# Patient Record
Sex: FEMALE | Race: WHITE | NOT HISPANIC OR LATINO | Employment: OTHER
[De-identification: names, ages, dates, MRNs, and addresses within clinical notes are randomized per-mention and may not be internally consistent; named-entity substitution may affect disease eponyms.]

---

## 2017-06-17 ENCOUNTER — HEALTH MAINTENANCE LETTER (OUTPATIENT)
Age: 52
End: 2017-06-17

## 2019-10-02 ENCOUNTER — HEALTH MAINTENANCE LETTER (OUTPATIENT)
Age: 54
End: 2019-10-02

## 2021-01-15 ENCOUNTER — HEALTH MAINTENANCE LETTER (OUTPATIENT)
Age: 56
End: 2021-01-15

## 2021-09-04 ENCOUNTER — HEALTH MAINTENANCE LETTER (OUTPATIENT)
Age: 56
End: 2021-09-04

## 2021-10-30 ENCOUNTER — HEALTH MAINTENANCE LETTER (OUTPATIENT)
Age: 56
End: 2021-10-30

## 2022-02-19 ENCOUNTER — HEALTH MAINTENANCE LETTER (OUTPATIENT)
Age: 57
End: 2022-02-19

## 2022-10-22 ENCOUNTER — HEALTH MAINTENANCE LETTER (OUTPATIENT)
Age: 57
End: 2022-10-22

## 2023-04-01 ENCOUNTER — HEALTH MAINTENANCE LETTER (OUTPATIENT)
Age: 58
End: 2023-04-01

## 2023-11-05 ENCOUNTER — HEALTH MAINTENANCE LETTER (OUTPATIENT)
Age: 58
End: 2023-11-05

## 2024-01-08 ENCOUNTER — LAB REQUISITION (OUTPATIENT)
Dept: LAB | Facility: CLINIC | Age: 59
End: 2024-01-08
Payer: COMMERCIAL

## 2024-01-08 PROCEDURE — 88342 IMHCHEM/IMCYTCHM 1ST ANTB: CPT | Mod: 26 | Performed by: PATHOLOGY

## 2024-01-08 PROCEDURE — 88341 IMHCHEM/IMCYTCHM EA ADD ANTB: CPT | Mod: 26 | Performed by: PATHOLOGY

## 2024-01-08 PROCEDURE — 88342 IMHCHEM/IMCYTCHM 1ST ANTB: CPT | Mod: TC,ORL | Performed by: OBSTETRICS & GYNECOLOGY

## 2024-01-08 PROCEDURE — 88305 TISSUE EXAM BY PATHOLOGIST: CPT | Mod: 26 | Performed by: PATHOLOGY

## 2024-01-18 LAB
PATH REPORT.COMMENTS IMP SPEC: ABNORMAL
PATH REPORT.COMMENTS IMP SPEC: YES
PATH REPORT.FINAL DX SPEC: ABNORMAL
PATH REPORT.GROSS SPEC: ABNORMAL
PATH REPORT.MICROSCOPIC SPEC OTHER STN: ABNORMAL
PATH REPORT.MICROSCOPIC SPEC OTHER STN: ABNORMAL
PATH REPORT.RELEVANT HX SPEC: ABNORMAL
PATHOLOGY SYNOPTIC REPORT: ABNORMAL
PHOTO IMAGE: ABNORMAL

## 2024-03-08 NOTE — PROGRESS NOTES
GYNECOLOGIC ONCOLOGY CONSULT    Patient Name: BARBIE OCNTI Patient : 1965  Patient MRN: [id number]  Referring Physician: Kenia Griffith MD (Obstetrics/Gynecology)  Primary GYNOncologist: Sweetie Young (Gynecological/Oncology)  Date of Service: 2024    Reason for Consult:  FIGO grade 1 endometrioid endometrial carcinoma    History of Present Illness (Gyn Oncology):  Barbie Conti is a edu 58 year old  post-menopausal female with a new diagnosis of FIGO grade 1 endometrioid endometrial carcinoma. After workup for postmenopausal bleeding, she had a pelvic ultrasound on 2023, which demonstrated a 9.4 x 6 x 4.5 cm uterus with an 18.4 mm stripe. Bilateral ovaries are not visualized. She was taken to the operating room and underwent hysteroscopy and D&C on 2024. This revealed FIGO grade 1 endometrioid endometrial carcinoma, MMR intact. She was referred to GYN oncology and is here today for initial consultation. She is unaccompanied. She denies any previous issues or family history of issues with anesthesia.    Genetic Testing  pMMR on endometrial biopsy    Review of Systems:  A complete 14-point review of systems is negative except as noted in the above history of present illness.    Past Medical History:  1. Anxiety  2. Hypothyroidism  3. History of kidney stones  4. Hypertension  5. Hyperlipidemia    Surgical History:  1. Tonsillectomy  2. Laparoscopic cholecystectomy -   3. Hysteroscopy, dilation and curettage on 2024    OB/Gyn History:  .  x 4.   Has history of uterine fibroids  Reports a history of abnormal Pap smears, last Pap smear on 2023 was NILM with negative HPV code testing    Allergies#  Fish Containing Products and tree and shrub pollen    Medications:  Propranolol Oral 10 mg tablet po PRN (for situational anxiety)  Rizatriptan Oral 10 mg tablet po PRN (onset of headache)  Ketorolac Oral 10 mg tablet po every 6 hrs PRN  Levothyroxine Oral 125  mcg tablet po QD before breakfast  Lorazepam Oral 0.5 mg tablet po  Trazodone Oral 50 mg tablet 1/2 tablet po HS  Levalbuterol Inhaler 45 mcg/actuation HFA aerosol inhaler every 6 hrs PRN 1-2 puffs  Venlafaxine Oral 24 hr Cap 150 mg capsule,extended release 24hr po daily    Family History:  Endometrial Ca - PGM    Social History:  Smoking Status Smoking Tobacco : Never smoker; Smokeless Tobacco : Never used smokeless tobacco; Vaping : Never vaped  Reports minimal alcohol use.   x 33 years.  lives a half block away with his mother. Has 2 adult sons who live at home with her.  Lives in house in [place].   Homemaker.    Health Maintenance:  Last pap smear: 2023  Last mammogram:   Last colonoscopy:     Vital Signs:  Blood pressure: 132/86, Pulse: 69, Temperature: 98.3 F, Respirations: 16, O2 sat: 96%, Pain Scale: 0, Height: 67 in, Weight: 279.6 lb, BSA: 2.33, BMI: 43.79 kg/m2    Physical Exam (Gyn Oncology):  General: alert, cooperative, conversational, no acute distress  HEENT: atraumatic, normocephalic, no scleral icterus, trachea midline, no obvious thyromegaly  Cardiac: RRR, no murmurs heard  Lungs: CTAB, good inspiratory effort bilaterally  Abdomen: soft, non-distended, non-tender.   Extremities: no edema, non-tender  Neurologic Exam: no focal deficits  Psych: normal mood and affect  Pelvic: deferred to OR      Laboratory Data:    2024 - Pathology Reports    Final Diagnosis  A. Endometrium, curettings:  -ENDOMETRIAL ENDOMETRIOID CARCINOMA, WITH MUCINOUS FEATURES, FIGO grade 1.  -Ancillary testing (see biomarker report for details):  -MMR IHC: INTACT.            [facility] - Ultrasound reports (scanned) - US Pelvis    Uterus & Endometrium  Appearance  The uterus is sonographically normal in size and echotexture.  Size (L, W, H) 9.4 cm  6.0 cm  4.5 cm  Fibroid None  visualized  Uterine Position  anteverted  Endometrium 18.4 mm  Non-Trilaminar, appears thickened and  irregular.    Adnexa & Cul de Sac  Right Ovary not visualized  Size (L,W,H,V)  Left Ovary not visualized  Size (L,W,H,V)  Cul de Sac No Fluid  Adnexas Appears within normal limits    Problems:  Endometrial carcinoma    Assessment & Plan (Gyn Oncology):  Barbie Downey is a edu 58 year old  post-menopausal female with a new diagnosis of FIGO grade 1 endometrioid endometrial carcinoma.    1. Endometrial cancer  Reviewed the overall incidents of endometrial cancer.  We reviewed the gold standard of care to proceed with surgery.  We reviewed risks, benefits, and alternatives to surgical management.  The patient desires to proceed with gold standard of care.  We discussed that alternatives are reserved for patients who are deemed surgically unfit candidates and/or desire for fertility preservation. She does not appear to be in either of those categories. We will obtain a preoperative clearance visit.  We discussed benefits of surgery, including therapeutic removal of cancer, ability to pathologically stage, ability to inform overall prognosis, and further if any adjuvant therapy is required.  Risks of surgery, including but not limited to: bleeding, infection, injury to surrounding structures, failure to cure, increased risk of blood clots, risks associated with general anesthesia, postoperative pain/discomfort, hernia formation.  We reviewed postoperative restrictions.  She completed surgical education with Angelina Salgado RN.  She was able to meet with our surgical scheduling team to discuss future surgical dates.       Treatment Plan and Consent    Current treatment plan of surgery for treatment and staging of endometrial cancer was reviewed in detail with the patient. See counseling above. Specifically, the counseling included: goals of the treatment, prognosis, frequency, intended benefits, associated risks/harms and side effects and medically reasonable alternatives.    I spent a total of 65 minutes of  cumulative time in care of this patient, which included >50% of time spent in direct patient care which included patient interview, examination, and treatment counseling. The remainder of the time was spent in medical documentation, review of records and care coordination.    I provided the patient an opportunity to ask questions and I answered all of their treatment related questions to the best of my ability. The patient expressed understanding and consent to this plan of care.      Pain Care Management:  Pain Scale: 0    The patient and family/friends if present were apprised of the use of [employer] remote documentation service and all parties consented to conducting the visit in this manner.    Documentation assistance provided by Marli Bender, scribing for Sweetie Young MD on 02/07/2024.    I, Sweetie Young MD,  personally performed the services described in this documentation, and it is both accurate and complete.      Sweetie Young MD  Phone: [phone]  Fax: [phone]

## 2024-03-13 ENCOUNTER — ANESTHESIA EVENT (OUTPATIENT)
Dept: SURGERY | Facility: CLINIC | Age: 59
End: 2024-03-13
Payer: COMMERCIAL

## 2024-03-13 RX ORDER — LEVOTHYROXINE SODIUM 125 UG/1
125 TABLET ORAL DAILY
COMMUNITY

## 2024-03-13 RX ORDER — VENLAFAXINE HYDROCHLORIDE 150 MG/1
150 CAPSULE, EXTENDED RELEASE ORAL DAILY
COMMUNITY

## 2024-03-13 RX ORDER — LORAZEPAM 0.5 MG/1
0.5 TABLET ORAL EVERY 6 HOURS PRN
COMMUNITY

## 2024-03-13 RX ORDER — BENZONATATE 200 MG/1
200 CAPSULE ORAL 3 TIMES DAILY PRN
COMMUNITY

## 2024-03-13 RX ORDER — RIZATRIPTAN BENZOATE 10 MG/1
10 TABLET ORAL
COMMUNITY

## 2024-03-13 RX ORDER — PROPRANOLOL HYDROCHLORIDE 10 MG/1
10 TABLET ORAL PRN
COMMUNITY

## 2024-03-13 RX ORDER — KETOROLAC TROMETHAMINE 10 MG/1
10 TABLET, FILM COATED ORAL EVERY 6 HOURS PRN
COMMUNITY

## 2024-03-13 NOTE — OP NOTE
Gynecologic Oncology Operative Report    3/14/2024  Barbie Downey  [id number]    PREOPERATIVE DIAGNOSIS: FIGO grade 1 endometrioid endometrial carcinoma (pMMR), BMI 43.79    POSTOPERATIVE DIAGNOSIS: Same    PROCEDURES:   Procedure(s):  ROBOTIC-ASSISTED TOTAL LAPAROSCOPIC HYSTERETOMY WITH BILATERAL SALPINGO-OOPHORECTOMY  BILATERAL SENTINEL LYMPH NODE DISSECTION    SURGEON: Sweetie Young MD    FIRST ASSISTANT: Nata Hackett PA-C    ANESTHESIA: General endotracheal.     ESTIMATED BLOOD LOSS: 100 cc     IV FLUIDS: 1500 ml crystalloid    URINE OUTPUT: 250 cc clear urine     INDICATIONS: Barbie Downey is a 59 yo  postmenopausal female with a new diagnosis of FIGO grade 1 endometrioid endometrial carcinoma. After workup for postmenopausal bleeding, she had a pelvic ultrasound on 2023, which demonstrated a 9.4 x 6 x 4.5 cm uterus with an 18.4 mm stripe. Bilateral ovaries are not visualized. She was taken to the operating room and underwent hysteroscopy and D&C on 2024. This revealed FIGO grade 1 endometrioid endometrial carcinoma, pMMR and referred for gynecologic oncology consultation. She desired to proceed with standard of care surgery.    FINDINGS: On abdominal entry, there was no free fluid. There were some filmy adhesions from sigmoid colon to posterior uterus and left pelvic sidewall that was taken down. The uterus was mildly enlarged with grossly normal-appearing bilateral adnexa. The bladder and posterior cul-de-sac were smooth. Bilateral sentinel lymph node mapping was successful. The right sentinel pelvic lymph node was located at mid-portion of right external iliac artery. The left sentinel pelvic lymph node was in left obturator space and the cephalad portion of bundle at the level of the left external iliac artery. Bilateral ureters were without evidence of hydronephrosis and were vermiculating throughout the case. Both were lateralized and dissected away from the peritoneum to  seal uterine pedicles near their origins, bilaterally, due to just more oozing than usual throughout the case. There was no overt bulky retroperitoneal lymphadenopathy. The peritoneal surfaces were smooth including bilateral hemidiaphragms. The liver edge, stomach and omentum were grossly normal. The small bowel and colon were without any obvious abnormalities. There was minor oozing at the end of the case with bilateral uterine pedicles multiply sealed ~1 cm medial to each ureter. A total of 20 ml of Surgiflo was placed at the end of the case. The vaginal cuff was palpably intact and no vaginal lacerations were noted. There was no vaginal bleeding.     SPECIMENS:   ID Type Source Tests Collected by Time Destination   1 : RIGHT SENTINEL PELVIC LYMPH NODE Tissue Lymph Node(s), Pelvis, Right SURGICAL PATHOLOGY EXAM Sweetie Young MD 3/14/2024 12:02 PM    2 : LEFT PELVIC SENTINEL LYMPH NODE Tissue Lymph Node(s), Pelvis, Left SURGICAL PATHOLOGY EXAM Sweetie Young MD 3/14/2024 12:25 PM    3 : UTERUS, CERVIX, BILATERAL FALLOPIAN TUBES AND OVARIES Tissue Uterus, Cervix, Bilateral Fallopian Tubes & Ovaries SURGICAL PATHOLOGY EXAM Sweetie Young MD 3/14/2024 12:45 PM        COMPLICATIONS: None.    CONDITION: Stable to PACU.    OPERATIVE PROCEDURE IN DETAIL: Consent was reviewed with the patient in the preoperative setting and confirmed. She received prophylactic antibiotics with IV Cefazolin 2 grams and IV Metronidazole 500 milligrams. In addition, she received bilateral sequential compression devices and prophylactic SQ Heparin 5,000 units for venous thromboembolism prevention. A surgical debriefing was performed with the operating room team prior to patient entry into the operating room. The patient was transferred to the operating room and placed in dorsal supine position. General anesthetic was obtained in the usual manner without noted difficulties. The patient was then positioned onto [facility]  andrey. The patient was prepped and draped for the above-mentioned procedure.    Timeout was called at which point the patient's name, procedure and operative site was confirmed by the operative team. Nata Hackett PA-C went down below and placed the Lomeli catheter under sterile technique. She placed a speculum in the vagina and anterior lip of the cervix was grasped. Next, the uterus sounded to 9 cm, and cervix was serially dilated. Indocyanine green (ICG) dye was injected superficially and deep at 3 o'clock and 9 o'clock. Approximately 1 cc was used x 4. The medium VCare uterine manipulator was then inserted and the cervical cup affixed without any difficulty. There was posterior uterine perforation noted and Vcare uterine manipulator was adjusted under direct visualization. Simultaneously, I was up top at the patient's abdomen. The umbilicus was elevated and the Veress needle introduced through the base of the umbilicus. The abdomen was insufflated with an opening pressure of 4 mmHg. The Veress needle was removed. Sites for the da Kyle XI laparoscopic ports were demarcated, total of 5, initiating' midline at the level of the umbilicus and positioned in a straight line around the upper abdomen. The initial midline 8 mm port was inserted without difficulties. The remainder of the 4 ports were placed under direct visualization, without any vascular injury or injury to surrounding structures. There were 2 left lateral 8 mm da Kyle ports placed. The lateral most right port was 8 mm AirSeal port. The right port between AirSeal port and midline port was placed and was a 8 mm da Kyle port. The CO2 insufflation was switched over to AirSeal mode. The patient was placed into steep Trendelenburg. The pelvis was inspected as well as the upper abdomen as noted above. Bowels were packed up into the upper abdomen with gentle traction. At this point, da Kyle XI was docked onto the ports, all appropriate instruments were  "inserted.     The procedure was initiated by performing bilateral sentinel lymph node mapping. The da Kyle XI firefly mode was utilized to denote the ICG uptake along lymphatic channels. A sentinel node was mapped bilaterally with further details above. We isolated the right round ligament, which was cauterized and transected. The posterior leaf of the broad ligament was dissected. The right ureter was identified and noted to be peristalsing. The right ureter was retracted medially from the sentinel lymph node. A peritoneal window was created just below the right IP ligament and well above the right ureter. The right IP ligament was multiply sealed and transected using Synchroseal device. The posterior peritoneum was dissected to the level of the posterior uterus. The right external iliac artery and right external iliac vein were both identified. The right obturator nerve was identified. The node was carefully excised from these critical structures and removed via assistant port using spoon graspers. This was labeled as \"right sentinel pelvic lymph node\" and sent to surgical pathology for routine analysis. The same process was repeated on the left side with respective labeling of \"left sentinel pelvic lymph node\" and also sent for routine analysis.     At this point, we initiated the hysterectomy by incising the vesicouterine peritoneum. The bladder flap was developed well at the upper vagina. The uterus had adequate mobility, bilateral uterine arteries could be isolated and these were cauterized with the bipolar cautery and transected. Initially on the right, we extended along the cardinal and uterosacral ligaments, staying close to the cervix to the level of the upper vagina. This was cauterized and transected using the synchroseal device. Similarly, we isolated out the left cardinal ligament and uterosacral ligament which were cauterized and transected. At this point, we were able to palpate the line of the " "VCare cup at the level of the upper vagina in a circumferential manner. We incised along the upper vagina to resect the cervix and uterus. The specimen was brought out through the vaginal opening and labeled \"uterus, cervix, bilateral fallopian tubes and ovaries\" and sent to surgical pathology for routine analysis.    A wet laparotomy sponge was inserted to obtain adequate insufflation. The vaginal cuff had been well-developed and clearly the bladder was out of the way. The vaginal cuff was closed with running V-lock suture with excellent reapproximation. There was some bleeding noted at the right uterine pedicle. The right ureter was dissected away from the peritoneum and into the tunnel of Wertheim. While retracting the ureter laterally and under direct visualization, the synchroseal device was utilized to multiply seal the right uterine pedicle ~1 cm medially to the right ureter. There was some mild oozing on the left side. The same process was repeated with the left uterine pedicle. Hemostasis of the pelvis and the lymph node beds was obtained. The pelvis was copiously irrigated and inspected, again. A total of 20 ml of Surgiflo was placed along the pelvic dissection sites.     All laparoscopic instruments and ports were now removed and CO2 allowed to escape from the abdomen. The Yassetsi XI robot was undocked without incident. Skin was reapproximated with 4-0 Monocryl sutures and skin glue applied.    The laparotomy sponge was removed from the vagina. The Lomeli catheter was removed from the bladder. Vaginal examination confirmed intact vaginal cuff and no vaginal lacerations. There was no vaginal bleeding at the conclusion of the case.     Nata Hackett PA-C, was present and assisted the entire procedure. She assisted with placement of uterine manipulator, docking of robotic arms, adequate visualization, retraction, uterine manipulation, surgical specimen handling/retrieval, bedside assisting via assistant " port, undocking of robotic arms, and skin closure.      All sponge, lap, needle and instrument counts were correct x 2. The patient tolerated the procedure well and there were no complications. She was returned to the supine position and general anesthesia was reversed without difficulty. She was taken to the recovery room in stable condition. I was present and scrubbed the entire procedure.    Sweetie Young MD  Gynecologic Oncology  [facility]  03/14/2024

## 2024-03-13 NOTE — ANESTHESIA PREPROCEDURE EVALUATION
Anesthesia Pre-Procedure Evaluation    Patient: Barbie Downey   MRN: [id number] : 1965        Procedure : Procedure(s):  Robotic-assisted total laparoscopic hysterectomy, bilateral salpingo-oophorectomy,  intraoperative sentinel lymph node mapping and possible pelvic and para aortic dissections          Past Medical History:   Diagnosis Date    Anxiety disorder, unspecified     Complication of anesthesia     Difficult to wake up: Pt tolerates the faster releaseing meds    Hypertension     Hypothyroidism, unspecified     Incomplete right bundle branch block     Obese     Other migraine, not intractable, without status migrainosus     Prediabetes       Past Surgical History:   Procedure Laterality Date    CHOLECYSTECTOMY  1999    DILATION AND CURETTAGE  2024    EYE SURGERY Bilateral 2003    lens implant    HEEL SPUR SURGERY Bilateral 1989    TONSILLECTOMY  1981      Allergies   Allergen Reactions    Fish-Derived Products Hives     ALL TYPES      Social History     Tobacco Use    Smoking status: Never    Smokeless tobacco: Not on file   Substance Use Topics    Alcohol use: Yes     Comment: rare alcohol use -1 drink on month      Wt Readings from Last 1 Encounters:   No data found for Wt        EKG - SR, incomplete RBBB, LVH  K 5.0  HGB 13.6  Anesthesia Evaluation   Pt has had prior anesthetic.     No history of anesthetic complications (slow to wake up and history of shakes when waking up)       ROS/MED HX  ENT/Pulmonary:     (+)     ASHLY risk factors, snores loudly, hypertension, obese,                             (-) sleep apnea   Neurologic:     (+)      migraines,                       (-) no seizures and no CVA   Cardiovascular:     (+) Dyslipidemia hypertension- -   -  - -                                   (-) CHF and orthopnea/PND   METS/Exercise Tolerance:     Hematologic:  - neg hematologic  ROS     Musculoskeletal:  - neg musculoskeletal ROS     GI/Hepatic:    (-)  "GERD   Renal/Genitourinary:     (+)       Nephrolithiasis ,       Endo: Comment: Pre diabetic     (+)          thyroid problem, hypothyroidism,    Obesity,       Psychiatric/Substance Use:     (+) psychiatric history anxiety       Infectious Disease:       Malignancy:   (+) Malignancy, History of Other.Other CA enometrial cancer status post.    Other:            Physical Exam    Airway  airway exam normal      Mallampati: III   TM distance: > 3 FB   Neck ROM: full   Mouth opening: > 3 cm    Respiratory Devices and Support         Dental       (+) Minor Abnormalities - some fillings, tiny chips      Cardiovascular   cardiovascular exam normal       Rhythm and rate: regular and normal     Pulmonary   pulmonary exam normal        breath sounds clear to auscultation           OUTSIDE LABS:  CBC: No results found for: \"WBC\", \"HGB\", \"HCT\", \"PLT\"  BMP: No results found for: \"NA\", \"POTASSIUM\", \"CHLORIDE\", \"CO2\", \"BUN\", \"CR\", \"GLC\"  COAGS: No results found for: \"PTT\", \"INR\", \"FIBR\"  POC: No results found for: \"BGM\", \"HCG\", \"HCGS\"  HEPATIC: No results found for: \"ALBUMIN\", \"PROTTOTAL\", \"ALT\", \"AST\", \"GGT\", \"ALKPHOS\", \"BILITOTAL\", \"BILIDIRECT\", \"CORRINE\"  OTHER: No results found for: \"PH\", \"LACT\", \"A1C\", \"ALEK\", \"PHOS\", \"MAG\", \"LIPASE\", \"AMYLASE\", \"TSH\", \"T4\", \"T3\", \"CRP\", \"SED\"    Anesthesia Plan    ASA Status:  2       Anesthesia Type: General.     - Airway: ETT   Induction: Propofol.   Maintenance: Balanced.   Techniques and Equipment:     - Airway: Video-Laryngoscope       Consents    Anesthesia Plan(s) and associated risks, benefits, and realistic alternatives discussed. Questions answered and patient/representative(s) expressed understanding.     - Discussed:     - Discussed with:  Patient            Postoperative Care    Pain management: Multi-modal analgesia.   PONV prophylaxis: Ondansetron (or other 5HT-3), Dexamethasone or Solumedrol, Background Propofol Infusion     Comments:               Adelso Quezada MD    I " have reviewed the pertinent notes and labs in the chart from the past 30 days and (re)examined the patient.  Any updates or changes from those notes are reflected in this note.

## 2024-03-14 ENCOUNTER — ANESTHESIA (OUTPATIENT)
Dept: SURGERY | Facility: CLINIC | Age: 59
End: 2024-03-14
Payer: COMMERCIAL

## 2024-03-14 ENCOUNTER — HOSPITAL ENCOUNTER (OUTPATIENT)
Facility: CLINIC | Age: 59
Discharge: HOME | End: 2024-03-14
Attending: OBSTETRICS & GYNECOLOGY | Admitting: OBSTETRICS & GYNECOLOGY
Payer: COMMERCIAL

## 2024-03-14 VITALS
RESPIRATION RATE: 15 BRPM | DIASTOLIC BLOOD PRESSURE: 82 MMHG | TEMPERATURE: 97.7 F | SYSTOLIC BLOOD PRESSURE: 134 MMHG | WEIGHT: 277 LBS | HEART RATE: 92 BPM | HEIGHT: 67 IN | OXYGEN SATURATION: 93 % | BODY MASS INDEX: 43.47 KG/M2

## 2024-03-14 DIAGNOSIS — C54.1 ENDOMETRIAL CANCER (H): Primary | ICD-10-CM

## 2024-03-14 LAB
ABO/RH(D): NORMAL
ANTIBODY SCREEN: NEGATIVE
SPECIMEN EXPIRATION DATE: NORMAL

## 2024-03-14 PROCEDURE — 250N000009 HC RX 250: Performed by: OBSTETRICS & GYNECOLOGY

## 2024-03-14 PROCEDURE — 250N000009 HC RX 250: Performed by: NURSE ANESTHETIST, CERTIFIED REGISTERED

## 2024-03-14 PROCEDURE — 370N000017 HC ANESTHESIA TECHNICAL FEE, PER MIN: Performed by: OBSTETRICS & GYNECOLOGY

## 2024-03-14 PROCEDURE — 58542 LSH W/T/O UT 250 G OR LESS: CPT | Performed by: NURSE ANESTHETIST, CERTIFIED REGISTERED

## 2024-03-14 PROCEDURE — 250N000025 HC SEVOFLURANE, PER MIN: Performed by: OBSTETRICS & GYNECOLOGY

## 2024-03-14 PROCEDURE — 250N000011 HC RX IP 250 OP 636: Performed by: NURSE ANESTHETIST, CERTIFIED REGISTERED

## 2024-03-14 PROCEDURE — 250N000013 HC RX MED GY IP 250 OP 250 PS 637: Performed by: ANESTHESIOLOGY

## 2024-03-14 PROCEDURE — 250N000011 HC RX IP 250 OP 636: Performed by: ANESTHESIOLOGY

## 2024-03-14 PROCEDURE — 258N000003 HC RX IP 258 OP 636: Performed by: NURSE ANESTHETIST, CERTIFIED REGISTERED

## 2024-03-14 PROCEDURE — 36415 COLL VENOUS BLD VENIPUNCTURE: CPT | Performed by: OBSTETRICS & GYNECOLOGY

## 2024-03-14 PROCEDURE — 88307 TISSUE EXAM BY PATHOLOGIST: CPT | Mod: TC | Performed by: OBSTETRICS & GYNECOLOGY

## 2024-03-14 PROCEDURE — 250N000011 HC RX IP 250 OP 636: Performed by: PHYSICIAN ASSISTANT

## 2024-03-14 PROCEDURE — 272N000001 HC OR GENERAL SUPPLY STERILE: Performed by: OBSTETRICS & GYNECOLOGY

## 2024-03-14 PROCEDURE — 88342 IMHCHEM/IMCYTCHM 1ST ANTB: CPT | Mod: 26 | Performed by: PATHOLOGY

## 2024-03-14 PROCEDURE — 88309 TISSUE EXAM BY PATHOLOGIST: CPT | Mod: 26 | Performed by: PATHOLOGY

## 2024-03-14 PROCEDURE — 258N000001 HC RX 258: Performed by: OBSTETRICS & GYNECOLOGY

## 2024-03-14 PROCEDURE — 250N000013 HC RX MED GY IP 250 OP 250 PS 637: Performed by: PHYSICIAN ASSISTANT

## 2024-03-14 PROCEDURE — 58542 LSH W/T/O UT 250 G OR LESS: CPT | Performed by: ANESTHESIOLOGY

## 2024-03-14 PROCEDURE — 999N000141 HC STATISTIC PRE-PROCEDURE NURSING ASSESSMENT: Performed by: OBSTETRICS & GYNECOLOGY

## 2024-03-14 PROCEDURE — 250N000011 HC RX IP 250 OP 636: Performed by: OBSTETRICS & GYNECOLOGY

## 2024-03-14 PROCEDURE — 710N000012 HC RECOVERY PHASE 2, PER MINUTE: Performed by: OBSTETRICS & GYNECOLOGY

## 2024-03-14 PROCEDURE — 88307 TISSUE EXAM BY PATHOLOGIST: CPT | Mod: 26 | Performed by: PATHOLOGY

## 2024-03-14 PROCEDURE — 710N000009 HC RECOVERY PHASE 1, LEVEL 1, PER MIN: Performed by: OBSTETRICS & GYNECOLOGY

## 2024-03-14 PROCEDURE — 86900 BLOOD TYPING SEROLOGIC ABO: CPT | Performed by: OBSTETRICS & GYNECOLOGY

## 2024-03-14 PROCEDURE — 360N000080 HC SURGERY LEVEL 7, PER MIN: Performed by: OBSTETRICS & GYNECOLOGY

## 2024-03-14 RX ORDER — SODIUM CHLORIDE, SODIUM LACTATE, POTASSIUM CHLORIDE, CALCIUM CHLORIDE 600; 310; 30; 20 MG/100ML; MG/100ML; MG/100ML; MG/100ML
INJECTION, SOLUTION INTRAVENOUS CONTINUOUS PRN
Status: DISCONTINUED | OUTPATIENT
Start: 2024-03-14 | End: 2024-03-14

## 2024-03-14 RX ORDER — OXYCODONE HYDROCHLORIDE 5 MG/1
5-10 TABLET ORAL EVERY 4 HOURS PRN
Qty: 12 TABLET | Refills: 0 | Status: SHIPPED | OUTPATIENT
Start: 2024-03-14

## 2024-03-14 RX ORDER — PROPOFOL 10 MG/ML
INJECTION, EMULSION INTRAVENOUS PRN
Status: DISCONTINUED | OUTPATIENT
Start: 2024-03-14 | End: 2024-03-14

## 2024-03-14 RX ORDER — METRONIDAZOLE 500 MG/100ML
500 INJECTION, SOLUTION INTRAVENOUS
Status: COMPLETED | OUTPATIENT
Start: 2024-03-14 | End: 2024-03-14

## 2024-03-14 RX ORDER — LOSARTAN POTASSIUM 50 MG/1
50 TABLET ORAL DAILY
COMMUNITY

## 2024-03-14 RX ORDER — KETOROLAC TROMETHAMINE 30 MG/ML
INJECTION, SOLUTION INTRAMUSCULAR; INTRAVENOUS PRN
Status: DISCONTINUED | OUTPATIENT
Start: 2024-03-14 | End: 2024-03-14

## 2024-03-14 RX ORDER — CEFAZOLIN SODIUM/WATER 2 G/20 ML
2 SYRINGE (ML) INTRAVENOUS
Status: COMPLETED | OUTPATIENT
Start: 2024-03-14 | End: 2024-03-14

## 2024-03-14 RX ORDER — DEXAMETHASONE SODIUM PHOSPHATE 4 MG/ML
INJECTION, SOLUTION INTRA-ARTICULAR; INTRALESIONAL; INTRAMUSCULAR; INTRAVENOUS; SOFT TISSUE PRN
Status: DISCONTINUED | OUTPATIENT
Start: 2024-03-14 | End: 2024-03-14

## 2024-03-14 RX ORDER — CEFAZOLIN SODIUM/WATER 2 G/20 ML
2 SYRINGE (ML) INTRAVENOUS SEE ADMIN INSTRUCTIONS
Status: DISCONTINUED | OUTPATIENT
Start: 2024-03-14 | End: 2024-03-14 | Stop reason: HOSPADM

## 2024-03-14 RX ORDER — AMOXICILLIN 250 MG
1-2 CAPSULE ORAL 2 TIMES DAILY PRN
Qty: 30 TABLET | Refills: 0 | Status: SHIPPED | OUTPATIENT
Start: 2024-03-14

## 2024-03-14 RX ORDER — OXYCODONE HYDROCHLORIDE 5 MG/1
5 TABLET ORAL
Status: COMPLETED | OUTPATIENT
Start: 2024-03-14 | End: 2024-03-14

## 2024-03-14 RX ORDER — BUPIVACAINE HYDROCHLORIDE 5 MG/ML
INJECTION, SOLUTION EPIDURAL; INTRACAUDAL PRN
Status: DISCONTINUED | OUTPATIENT
Start: 2024-03-14 | End: 2024-03-14 | Stop reason: HOSPADM

## 2024-03-14 RX ORDER — FENTANYL CITRATE 0.05 MG/ML
50 INJECTION, SOLUTION INTRAMUSCULAR; INTRAVENOUS EVERY 5 MIN PRN
Status: DISCONTINUED | OUTPATIENT
Start: 2024-03-14 | End: 2024-03-14 | Stop reason: HOSPADM

## 2024-03-14 RX ORDER — LIDOCAINE HYDROCHLORIDE 20 MG/ML
INJECTION, SOLUTION INFILTRATION; PERINEURAL PRN
Status: DISCONTINUED | OUTPATIENT
Start: 2024-03-14 | End: 2024-03-14

## 2024-03-14 RX ORDER — ONDANSETRON 2 MG/ML
4 INJECTION INTRAMUSCULAR; INTRAVENOUS EVERY 30 MIN PRN
Status: DISCONTINUED | OUTPATIENT
Start: 2024-03-14 | End: 2024-03-14 | Stop reason: HOSPADM

## 2024-03-14 RX ORDER — FENTANYL CITRATE 0.05 MG/ML
25 INJECTION, SOLUTION INTRAMUSCULAR; INTRAVENOUS EVERY 5 MIN PRN
Status: DISCONTINUED | OUTPATIENT
Start: 2024-03-14 | End: 2024-03-14 | Stop reason: HOSPADM

## 2024-03-14 RX ORDER — HYDROMORPHONE HCL IN WATER/PF 6 MG/30 ML
0.2 PATIENT CONTROLLED ANALGESIA SYRINGE INTRAVENOUS EVERY 5 MIN PRN
Status: DISCONTINUED | OUTPATIENT
Start: 2024-03-14 | End: 2024-03-14 | Stop reason: HOSPADM

## 2024-03-14 RX ORDER — ONDANSETRON 4 MG/1
4 TABLET, ORALLY DISINTEGRATING ORAL EVERY 30 MIN PRN
Status: DISCONTINUED | OUTPATIENT
Start: 2024-03-14 | End: 2024-03-14 | Stop reason: HOSPADM

## 2024-03-14 RX ORDER — FENTANYL CITRATE 50 UG/ML
INJECTION, SOLUTION INTRAMUSCULAR; INTRAVENOUS PRN
Status: DISCONTINUED | OUTPATIENT
Start: 2024-03-14 | End: 2024-03-14

## 2024-03-14 RX ORDER — SODIUM CHLORIDE, SODIUM LACTATE, POTASSIUM CHLORIDE, CALCIUM CHLORIDE 600; 310; 30; 20 MG/100ML; MG/100ML; MG/100ML; MG/100ML
INJECTION, SOLUTION INTRAVENOUS CONTINUOUS
Status: DISCONTINUED | OUTPATIENT
Start: 2024-03-14 | End: 2024-03-14 | Stop reason: HOSPADM

## 2024-03-14 RX ORDER — IBUPROFEN 400 MG/1
800 TABLET, FILM COATED ORAL ONCE
Status: DISCONTINUED | OUTPATIENT
Start: 2024-03-14 | End: 2024-03-14 | Stop reason: HOSPADM

## 2024-03-14 RX ORDER — HYDROMORPHONE HYDROCHLORIDE 1 MG/ML
INJECTION, SOLUTION INTRAMUSCULAR; INTRAVENOUS; SUBCUTANEOUS PRN
Status: DISCONTINUED | OUTPATIENT
Start: 2024-03-14 | End: 2024-03-14

## 2024-03-14 RX ORDER — HEPARIN SODIUM 5000 [USP'U]/.5ML
5000 INJECTION, SOLUTION INTRAVENOUS; SUBCUTANEOUS
Status: COMPLETED | OUTPATIENT
Start: 2024-03-14 | End: 2024-03-14

## 2024-03-14 RX ORDER — ACETAMINOPHEN 325 MG/1
975 TABLET ORAL ONCE
Status: DISCONTINUED | OUTPATIENT
Start: 2024-03-14 | End: 2024-03-14 | Stop reason: HOSPADM

## 2024-03-14 RX ORDER — INDOCYANINE GREEN AND WATER 25 MG
KIT INJECTION PRN
Status: DISCONTINUED | OUTPATIENT
Start: 2024-03-14 | End: 2024-03-14 | Stop reason: HOSPADM

## 2024-03-14 RX ORDER — NALOXONE HYDROCHLORIDE 0.4 MG/ML
0.1 INJECTION, SOLUTION INTRAMUSCULAR; INTRAVENOUS; SUBCUTANEOUS
Status: DISCONTINUED | OUTPATIENT
Start: 2024-03-14 | End: 2024-03-14 | Stop reason: HOSPADM

## 2024-03-14 RX ORDER — HYDROXYZINE HYDROCHLORIDE 25 MG/1
25 TABLET, FILM COATED ORAL ONCE
Status: COMPLETED | OUTPATIENT
Start: 2024-03-14 | End: 2024-03-14

## 2024-03-14 RX ORDER — ACETAMINOPHEN 500 MG
1000 TABLET ORAL ONCE
Status: COMPLETED | OUTPATIENT
Start: 2024-03-14 | End: 2024-03-14

## 2024-03-14 RX ORDER — ONDANSETRON 2 MG/ML
INJECTION INTRAMUSCULAR; INTRAVENOUS PRN
Status: DISCONTINUED | OUTPATIENT
Start: 2024-03-14 | End: 2024-03-14

## 2024-03-14 RX ORDER — HYDROMORPHONE HCL IN WATER/PF 6 MG/30 ML
0.4 PATIENT CONTROLLED ANALGESIA SYRINGE INTRAVENOUS EVERY 5 MIN PRN
Status: DISCONTINUED | OUTPATIENT
Start: 2024-03-14 | End: 2024-03-14 | Stop reason: HOSPADM

## 2024-03-14 RX ADMIN — PROPOFOL 200 MG: 10 INJECTION, EMULSION INTRAVENOUS at 10:55

## 2024-03-14 RX ADMIN — ONDANSETRON 4 MG: 2 INJECTION INTRAMUSCULAR; INTRAVENOUS at 13:13

## 2024-03-14 RX ADMIN — OXYCODONE HYDROCHLORIDE 5 MG: 5 TABLET ORAL at 14:39

## 2024-03-14 RX ADMIN — ROCURONIUM BROMIDE 10 MG: 50 INJECTION, SOLUTION INTRAVENOUS at 12:24

## 2024-03-14 RX ADMIN — SODIUM CHLORIDE, POTASSIUM CHLORIDE, SODIUM LACTATE AND CALCIUM CHLORIDE: 600; 310; 30; 20 INJECTION, SOLUTION INTRAVENOUS at 12:36

## 2024-03-14 RX ADMIN — FENTANYL CITRATE 50 MCG: 50 INJECTION INTRAMUSCULAR; INTRAVENOUS at 12:56

## 2024-03-14 RX ADMIN — MIDAZOLAM 2 MG: 1 INJECTION INTRAMUSCULAR; INTRAVENOUS at 10:52

## 2024-03-14 RX ADMIN — DEXAMETHASONE SODIUM PHOSPHATE 4 MG: 4 INJECTION, SOLUTION INTRA-ARTICULAR; INTRALESIONAL; INTRAMUSCULAR; INTRAVENOUS; SOFT TISSUE at 11:01

## 2024-03-14 RX ADMIN — HYDROMORPHONE HYDROCHLORIDE 0.4 MG: 0.2 INJECTION, SOLUTION INTRAMUSCULAR; INTRAVENOUS; SUBCUTANEOUS at 15:15

## 2024-03-14 RX ADMIN — ROCURONIUM BROMIDE 60 MG: 50 INJECTION, SOLUTION INTRAVENOUS at 10:56

## 2024-03-14 RX ADMIN — PHENYLEPHRINE HYDROCHLORIDE 100 MCG: 10 INJECTION INTRAVENOUS at 11:17

## 2024-03-14 RX ADMIN — PHENYLEPHRINE HYDROCHLORIDE 150 MCG: 10 INJECTION INTRAVENOUS at 11:24

## 2024-03-14 RX ADMIN — Medication 3 G: at 10:59

## 2024-03-14 RX ADMIN — HYDROXYZINE HYDROCHLORIDE 25 MG: 25 TABLET, FILM COATED ORAL at 10:15

## 2024-03-14 RX ADMIN — HYDROMORPHONE HYDROCHLORIDE 0.4 MG: 0.2 INJECTION, SOLUTION INTRAMUSCULAR; INTRAVENOUS; SUBCUTANEOUS at 15:01

## 2024-03-14 RX ADMIN — METRONIDAZOLE 500 MG: 500 INJECTION, SOLUTION INTRAVENOUS at 10:07

## 2024-03-14 RX ADMIN — SODIUM CHLORIDE, POTASSIUM CHLORIDE, SODIUM LACTATE AND CALCIUM CHLORIDE: 600; 310; 30; 20 INJECTION, SOLUTION INTRAVENOUS at 10:51

## 2024-03-14 RX ADMIN — FENTANYL CITRATE 100 MCG: 50 INJECTION INTRAMUSCULAR; INTRAVENOUS at 10:55

## 2024-03-14 RX ADMIN — SUGAMMADEX 200 MG: 100 INJECTION, SOLUTION INTRAVENOUS at 13:27

## 2024-03-14 RX ADMIN — FENTANYL CITRATE 50 MCG: 50 INJECTION INTRAMUSCULAR; INTRAVENOUS at 12:41

## 2024-03-14 RX ADMIN — PHENYLEPHRINE HYDROCHLORIDE 50 MCG: 10 INJECTION INTRAVENOUS at 11:29

## 2024-03-14 RX ADMIN — KETOROLAC TROMETHAMINE 30 MG: 30 INJECTION, SOLUTION INTRAMUSCULAR at 13:26

## 2024-03-14 RX ADMIN — PHENYLEPHRINE HYDROCHLORIDE 0.5 MCG/KG/MIN: 10 INJECTION INTRAVENOUS at 11:26

## 2024-03-14 RX ADMIN — HEPARIN SODIUM 5000 UNITS: 5000 INJECTION, SOLUTION INTRAVENOUS; SUBCUTANEOUS at 09:55

## 2024-03-14 RX ADMIN — ROCURONIUM BROMIDE 20 MG: 50 INJECTION, SOLUTION INTRAVENOUS at 11:50

## 2024-03-14 RX ADMIN — LIDOCAINE HYDROCHLORIDE 100 MG: 20 INJECTION, SOLUTION INFILTRATION; PERINEURAL at 10:55

## 2024-03-14 RX ADMIN — ROCURONIUM BROMIDE 10 MG: 50 INJECTION, SOLUTION INTRAVENOUS at 12:40

## 2024-03-14 RX ADMIN — ACETAMINOPHEN 1000 MG: 500 TABLET, FILM COATED ORAL at 10:15

## 2024-03-14 RX ADMIN — HYDROMORPHONE HYDROCHLORIDE 0.5 MG: 1 INJECTION, SOLUTION INTRAMUSCULAR; INTRAVENOUS; SUBCUTANEOUS at 11:34

## 2024-03-14 ASSESSMENT — ACTIVITIES OF DAILY LIVING (ADL)
ADLS_ACUITY_SCORE: 35

## 2024-03-14 ASSESSMENT — ENCOUNTER SYMPTOMS
ORTHOPNEA: 0
SEIZURES: 0

## 2024-03-14 NOTE — OR NURSING
Umbilical incision produced a moderate amount of bloody drainage. Gentle pressure applied and gauze dressing applied. Continue to monitor.

## 2024-03-14 NOTE — BRIEF OP NOTE
POST OPERATIVE NOTE-IMMEDIATE :    Procedures:  Procedure(s):  ROBOTIC-ASSISTED TOTAL LAPAROSCOPIC HYSTERETOMY WITH BILATERAL SALPINGO-OOPHORECTOMY  BILATERAL SENTINEL LYMPH NODE DISSECTION    Preoperative Diagnosis:  Endometrial neoplasm malignant (H) [C54.1]  BMI 43.79    Postoperative Diagnosis:  Same    Prosthetic Devices:  None    Surgeon(s) and Assistants (if any):  Surgeon(s):  Nata Hackett PA-C Mallen, Adrianne R, MD  Circulator: Xin Chavarria RN  Relief Circulator: Jose G Ware RN  Relief Scrub: Mari Harrington  Scrub Person: Sandra Fernandez    Anesthesia:  General    Estimated Blood Loss: 100 cc    IV Fluids: 1500 ml crystalloid    Urine Output: 250 ml     Drains: None    Specimens:    ID Type Source Tests Collected by Time Destination   1 : RIGHT SENTINEL PELVIC LYMPH NODE Tissue Lymph Node(s), Pelvis, Right SURGICAL PATHOLOGY EXAM Sweetie Young MD 3/14/2024 12:02 PM    2 : LEFT PELVIC SENTINEL LYMPH NODE Tissue Lymph Node(s), Pelvis, Left SURGICAL PATHOLOGY EXAM Sweetie Young MD 3/14/2024 12:25 PM    3 : UTERUS, CERVIX, BILATERAL FALLOPIAN TUBES AND OVARIES Tissue Uterus, Cervix, Bilateral Fallopian Tubes & Ovaries SURGICAL PATHOLOGY EXAM Sweetie Young MD 3/14/2024 12:45 PM        Complications: None    Findings/Conclusions:  On abdominal entry, there was no free fluid. There were some filmy adhesions from sigmoid colon to posterior uterus and left pelvic sidewall that was taken down. The uterus was mildly enlarged with grossly normal-appearing bilateral adnexa. The bladder and posterior cul-de-sac were smooth. Bilateral sentinel lymph node mapping was successful. The right sentinel pelvic lymph node was located at mid-portion of right external iliac artery. The left sentinel pelvic lymph node was in left obturator space and the cephalad portion of bundle at the level of the left external iliac artery. Bilateral ureters were without evidence of  hydronephrosis and were vermiculating throughout the case. Both were lateralized and dissected away from the peritoneum to seal uterine pedicles near their origins, bilaterally, due to just more oozing than usual throughout the case. There was no overt bulky retroperitoneal lymphadenopathy. The peritoneal surfaces were smooth including bilateral hemidiaphragms. The liver edge, stomach and omentum were grossly normal. The small bowel and colon were without any obvious abnormalities. There was minor oozing at the end of the case with bilateral uterine pedicles multiply sealed ~1 cm medial to each ureter. A total of 20 ml of Surgiflo was placed at the end of the case. The vaginal cuff was palpably intact and no vaginal lacerations were noted. There was no vaginal bleeding.     Condition on discharge from OR:  Satisfactory    Sweetie Young MD  Gynecologic Oncology  [facility]  [facility]

## 2024-03-14 NOTE — DISCHARGE INSTRUCTIONS
Today you were given 975 mg of Tylenol at 4:15PM. The recommended daily maximum dose is 4000 mg.    Same Day Surgery Discharge Instructions for  Sedation and General Anesthesia     It's not unusual to feel dizzy, light-headed or faint for up to 24 hours after surgery or while taking pain medication.  If you have these symptoms: sit for a few minutes before standing and have someone assist you when you get up to walk or use the bathroom.    You should rest and relax for the next 24 hours. We recommend you make arrangements to have an adult stay with you for at least 24 hours after your discharge.  Avoid hazardous and strenuous activity.    DO NOT DRIVE any vehicle or operate mechanical equipment for 24 hours following the end of your surgery.  Even though you may feel normal, your reactions may be affected by the medication you have received.    Do not drink alcoholic beverages for 24 hours following surgery.     Slowly progress to your regular diet as you feel able. It's not unusual to feel nauseated and/or vomit after receiving anesthesia.  If you develop these symptoms, drink clear liquids (apple juice, ginger ale, broth, 7-up, etc. ) until you feel better.  If your nausea and vomiting persists for 24 hours, please notify your surgeon.      All narcotic pain medications, along with inactivity and anesthesia, can cause constipation. Drinking plenty of liquids and increasing fiber intake will help.    For any questions of a medical nature, call your surgeon.    Do not make important decisions for 24 hours.    If you had general anesthesia, you may have a sore throat for a couple of days related to the breathing tube used during surgery.  You may use Cepacol lozenges to help with this discomfort.  If it worsens or if you develop a fever, contact your surgeon.     If you feel your pain is not well managed with the pain medications prescribed by your surgeon, please contact your surgeon's office to let them know so  they can address your concerns.      Today you received Toradol, an antiinflammatory medication similar to Ibuprofen.  You should not take other antiinflammatory medication, such as Ibuprofen, Motrin, Advil, Aleve, Naprosyn, etc until  7:30PM  **If you have questions or concerns about your procedure,   call Dr. Young at [phone]**

## 2024-03-14 NOTE — ANESTHESIA POSTPROCEDURE EVALUATION
Patient: Barbie Downey    Procedure: Procedure(s):  ROBOTIC-ASSISTED TOTAL LAPAROSCOPIC HYSTERETOMY WITH BILATERAL SALPINGO-OOPHORECTOMY  INTRAOPERATIVE BILATERAL SENTINEL LYMPH NODE DISSECTION       Anesthesia Type:  General    Note:     Postop Pain Control: Uneventful            Sign Out: ONGOING pain issues   PONV: No   Neuro/Psych: Uneventful            Sign Out: Acceptable/Baseline neuro status   Airway/Respiratory: Uneventful            Sign Out: Acceptable/Baseline resp. status   CV/Hemodynamics: Uneventful            Sign Out: Acceptable CV status; No obvious hypovolemia; No obvious fluid overload   Other NRE: NONE   DID A NON-ROUTINE EVENT OCCUR? No           Last vitals:  Vitals Value Taken Time   /81 03/14/24 1530   Temp 36.5  C (97.7  F) 03/14/24 1500   Pulse 82 03/14/24 1533   Resp 12 03/14/24 1533   SpO2 95 % 03/14/24 1533   Vitals shown include unfiled device data.    Electronically Signed By: Adelso Quezada MD  March 14, 2024  3:34 PM

## 2024-03-14 NOTE — ANESTHESIA PROCEDURE NOTES
Airway       Patient location during procedure: OR       Procedure Start/Stop Times: 3/14/2024 10:58 AM  Staff -        CRNA: Billy Smith APRN CRNA       Performed By: CRNA  Consent for Airway        Urgency: elective  Indications and Patient Condition       Indications for airway management: gilberto-procedural       Induction type:intravenous       Mask difficulty assessment: 1 - vent by mask    Final Airway Details       Final airway type: endotracheal airway       Successful airway: ETT - single  Endotracheal Airway Details        ETT size (mm): 7.0       Cuffed: yes       Successful intubation technique: video laryngoscopy       VL Blade Size: Davis 3       Grade View of Cords: 2       Adjucts: stylet       Position: Right       Measured from: gums/teeth       Secured at (cm): 21       Bite block used: None    Post intubation assessment        Placement verified by: capnometry, equal breath sounds and chest rise        Number of attempts at approach: 1       Number of other approaches attempted: 0       Secured with: tape       Ease of procedure: easy       Dentition: Intact and Unchanged    Medication(s) Administered   Medication Administration Time: 3/14/2024 10:58 AM

## 2024-03-14 NOTE — ANESTHESIA CARE TRANSFER NOTE
Patient: Barbie Downey    Procedure: Procedure(s):  ROBOTIC-ASSISTED TOTAL LAPAROSCOPIC HYSTERETOMY WITH BILATERAL SALPINGO-OOPHORECTOMY  INTRAOPERATIVE BILATERAL SENTINEL LYMPH NODE DISSECTION       Diagnosis: Endometrial neoplasm malignant (H) [C54.1]  Diagnosis Additional Information: No value filed.    Anesthesia Type:   General     Note:    Oropharynx: oropharynx clear of all foreign objects and spontaneously breathing  Level of Consciousness: drowsy  Oxygen Supplementation: face mask  Level of Supplemental Oxygen (L/min / FiO2): 6  Independent Airway: airway patency satisfactory and stable  Dentition: dentition unchanged  Vital Signs Stable: post-procedure vital signs reviewed and stable  Report to RN Given: handoff report given  Patient transferred to: PACU    Handoff Report: Identifed the Patient, Identified the Reponsible Provider, Reviewed the pertinent medical history, Discussed the surgical course, Reviewed Intra-OP anesthesia mangement and issues during anesthesia, Set expectations for post-procedure period and Allowed opportunity for questions and acknowledgement of understanding      Vitals:  Vitals Value Taken Time   /72 03/14/24 1347   Temp     Pulse 89 03/14/24 1350   Resp 21 03/14/24 1350   SpO2 98 % 03/14/24 1350   Vitals shown include unfiled device data.    Electronically Signed By: SEVERO Ding CRNA  March 14, 2024  1:51 PM

## 2024-03-18 LAB
INTERPRETATION: NORMAL
SIGNIFICANT RESULTS: NORMAL
SPECIMEN DESCRIPTION: NORMAL
TEST DETAILS, MDL: NORMAL

## 2024-03-19 PROCEDURE — G0452 MOLECULAR PATHOLOGY INTERPR: HCPCS | Mod: 26 | Performed by: PATHOLOGY

## 2024-03-19 PROCEDURE — 81479 UNLISTED MOLECULAR PATHOLOGY: CPT | Performed by: OBSTETRICS & GYNECOLOGY

## 2024-03-27 LAB
PATH REPORT.ADDENDUM SPEC: ABNORMAL
PATH REPORT.COMMENTS IMP SPEC: ABNORMAL
PATH REPORT.COMMENTS IMP SPEC: ABNORMAL
PATH REPORT.COMMENTS IMP SPEC: YES
PATH REPORT.FINAL DX SPEC: ABNORMAL
PATH REPORT.GROSS SPEC: ABNORMAL
PATH REPORT.MICROSCOPIC SPEC OTHER STN: ABNORMAL
PATH REPORT.RELEVANT HX SPEC: ABNORMAL
PATHOLOGY SYNOPTIC REPORT: ABNORMAL
PHOTO IMAGE: ABNORMAL

## 2024-08-10 ENCOUNTER — HEALTH MAINTENANCE LETTER (OUTPATIENT)
Age: 59
End: 2024-08-10

## 2025-08-16 ENCOUNTER — HEALTH MAINTENANCE LETTER (OUTPATIENT)
Age: 60
End: 2025-08-16

## (undated) DEVICE — DRAPE CV SPLIT II 147X106" 9158

## (undated) DEVICE — DAVINCI XI DRAPE COLUMN 470341

## (undated) DEVICE — SU MONOCRYL 4-0 PS-2 18" UND Y496G

## (undated) DEVICE — ANTIFOG SOLUTION SEE SHARP 150M TROCAR SWABS 30978

## (undated) DEVICE — DAVINCI XI MONOPOLAR SCISSORS HOT SHEARS 8MM 470179

## (undated) DEVICE — DAVINCI XI DRAPE ARM 470015

## (undated) DEVICE — RX SURGIFLO HEMOSTATIC MATRIX W/THROMBIN 8ML 2994

## (undated) DEVICE — DAVINCI XI OBTURATOR BLADELESS 8MM 470359

## (undated) DEVICE — LINEN TOWEL PACK X5 5464

## (undated) DEVICE — DAVINCI HOT SHEARS TIP COVER  400180

## (undated) DEVICE — SOL NACL 0.9% IRRIG 1000ML BOTTLE 2F7124

## (undated) DEVICE — DAVINCI XI TISSUE SEALER SYNCROSEAL 8MM 480440

## (undated) DEVICE — DAVINCI XI NDL DRIVER LARGE 470006

## (undated) DEVICE — GLOVE BIOGEL PI MICRO SZ 6.5 48565

## (undated) DEVICE — CATH TRAY FOLEY SURESTEP 16FR WDRAIN BAG STLK LATEX A300316A

## (undated) DEVICE — SOL NACL 0.9% INJ 1000ML BAG 2B1324X

## (undated) DEVICE — PACK DAVINCI GYN SMA15GDFS1

## (undated) DEVICE — SUCTION IRR STRYKERFLOW II W/TIP 250-070-520

## (undated) DEVICE — ENDO TROCAR CONMED AIRSEAL BLADELESS 08X120MM IAS8-120LP

## (undated) DEVICE — ESU GROUND PAD UNIVERSAL W/O CORD

## (undated) DEVICE — TUBING CONMED AIRSEAL SMOKE EVAC INSUFFLATION ASM-EVAC

## (undated) DEVICE — DAVINCI XI ESU FCP BIPOLAR MARYLAND 470172

## (undated) DEVICE — GLOVE BIOGEL PI MICRO INDICATOR UNDERGLOVE SZ 6.5 48965

## (undated) DEVICE — SU WND CLOSURE VLOC 180 ABS 0 12" GS-21 VLOCL0316

## (undated) DEVICE — KIT PATIENT POSITIONING PIGAZZI LATEX FREE 40580

## (undated) DEVICE — NDL SPINAL 22GA 3.5" QUINCKE 405181

## (undated) DEVICE — NDL INSUFFLATION 13GA 120MM C2201

## (undated) DEVICE — ENDO APPLICATOR SURGIFLO PLASMA COBLATION MS1995

## (undated) DEVICE — DAVINCI XI GRASPER ENDOWRIST PROGRASP 470093

## (undated) DEVICE — DAVINCI XI SEAL UNIVERSAL 5-8MM 470361

## (undated) DEVICE — SYR 10ML FINGER CONTROL W/O NDL 309695

## (undated) DEVICE — RETR ELEV / UTERINE MANIPULATOR V-CARE SM CUP 60-6085-200

## (undated) RX ORDER — HYDROMORPHONE HYDROCHLORIDE 1 MG/ML
INJECTION, SOLUTION INTRAMUSCULAR; INTRAVENOUS; SUBCUTANEOUS
Status: DISPENSED
Start: 2024-03-14

## (undated) RX ORDER — KETOROLAC TROMETHAMINE 30 MG/ML
INJECTION, SOLUTION INTRAMUSCULAR; INTRAVENOUS
Status: DISPENSED
Start: 2024-03-14

## (undated) RX ORDER — FENTANYL CITRATE 50 UG/ML
INJECTION, SOLUTION INTRAMUSCULAR; INTRAVENOUS
Status: DISPENSED
Start: 2024-03-14

## (undated) RX ORDER — HYDROXYZINE HYDROCHLORIDE 25 MG/1
TABLET, FILM COATED ORAL
Status: DISPENSED
Start: 2024-03-14

## (undated) RX ORDER — METRONIDAZOLE 500 MG/100ML
INJECTION, SOLUTION INTRAVENOUS
Status: DISPENSED
Start: 2024-03-14

## (undated) RX ORDER — HYDROMORPHONE HCL IN WATER/PF 6 MG/30 ML
PATIENT CONTROLLED ANALGESIA SYRINGE INTRAVENOUS
Status: DISPENSED
Start: 2024-03-14

## (undated) RX ORDER — OXYCODONE HYDROCHLORIDE 5 MG/1
TABLET ORAL
Status: DISPENSED
Start: 2024-03-14

## (undated) RX ORDER — BUPIVACAINE HYDROCHLORIDE 5 MG/ML
INJECTION, SOLUTION EPIDURAL; INTRACAUDAL
Status: DISPENSED
Start: 2024-03-14

## (undated) RX ORDER — INDOCYANINE GREEN AND WATER 25 MG
KIT INJECTION
Status: DISPENSED
Start: 2024-03-14

## (undated) RX ORDER — HEPARIN SODIUM 5000 [USP'U]/.5ML
INJECTION, SOLUTION INTRAVENOUS; SUBCUTANEOUS
Status: DISPENSED
Start: 2024-03-14

## (undated) RX ORDER — CEFAZOLIN SODIUM 1 G/3ML
INJECTION, POWDER, FOR SOLUTION INTRAMUSCULAR; INTRAVENOUS
Status: DISPENSED
Start: 2024-03-14

## (undated) RX ORDER — ACETAMINOPHEN 500 MG
TABLET ORAL
Status: DISPENSED
Start: 2024-03-14